# Patient Record
Sex: FEMALE | Race: WHITE | Employment: FULL TIME | URBAN - METROPOLITAN AREA
[De-identification: names, ages, dates, MRNs, and addresses within clinical notes are randomized per-mention and may not be internally consistent; named-entity substitution may affect disease eponyms.]

---

## 2018-10-10 ENCOUNTER — OFFICE VISIT (OUTPATIENT)
Dept: SURGERY | Facility: CLINIC | Age: 53
End: 2018-10-10
Payer: COMMERCIAL

## 2018-10-10 VITALS
WEIGHT: 293 LBS | BODY MASS INDEX: 51.91 KG/M2 | HEIGHT: 63 IN | SYSTOLIC BLOOD PRESSURE: 152 MMHG | RESPIRATION RATE: 12 BRPM | DIASTOLIC BLOOD PRESSURE: 102 MMHG | HEART RATE: 69 BPM | OXYGEN SATURATION: 96 %

## 2018-10-10 DIAGNOSIS — Z86.79 HISTORY OF HYPERTENSION: ICD-10-CM

## 2018-10-10 DIAGNOSIS — E66.01 MORBID OBESITY WITH BMI OF 50.0-59.9, ADULT (H): Primary | ICD-10-CM

## 2018-10-10 DIAGNOSIS — Z13.21 SCREENING FOR ENDOCRINE, NUTRITIONAL, METABOLIC AND IMMUNITY DISORDER: ICD-10-CM

## 2018-10-10 DIAGNOSIS — D64.9 CHRONIC ANEMIA: ICD-10-CM

## 2018-10-10 DIAGNOSIS — Z13.228 SCREENING FOR ENDOCRINE, NUTRITIONAL, METABOLIC AND IMMUNITY DISORDER: ICD-10-CM

## 2018-10-10 DIAGNOSIS — R60.0 LOWER EXTREMITY EDEMA: ICD-10-CM

## 2018-10-10 DIAGNOSIS — L30.4 INTERTRIGO: ICD-10-CM

## 2018-10-10 DIAGNOSIS — N39.3 STRESS INCONTINENCE: ICD-10-CM

## 2018-10-10 DIAGNOSIS — G43.909 MIGRAINE WITHOUT STATUS MIGRAINOSUS, NOT INTRACTABLE, UNSPECIFIED MIGRAINE TYPE: ICD-10-CM

## 2018-10-10 DIAGNOSIS — K59.09 CHRONIC CONSTIPATION: ICD-10-CM

## 2018-10-10 DIAGNOSIS — Z13.0 SCREENING FOR ENDOCRINE, NUTRITIONAL, METABOLIC AND IMMUNITY DISORDER: ICD-10-CM

## 2018-10-10 DIAGNOSIS — E66.01 MORBID OBESITY WITH BMI OF 50.0-59.9, ADULT (H): ICD-10-CM

## 2018-10-10 DIAGNOSIS — Z13.29 SCREENING FOR ENDOCRINE, NUTRITIONAL, METABOLIC AND IMMUNITY DISORDER: ICD-10-CM

## 2018-10-10 DIAGNOSIS — G47.9 SLEEP DISTURBANCE: ICD-10-CM

## 2018-10-10 DIAGNOSIS — Z13.0 SCREENING FOR IRON DEFICIENCY ANEMIA: ICD-10-CM

## 2018-10-10 PROBLEM — K52.9 CHRONIC DIARRHEA: Status: ACTIVE | Noted: 2018-10-10

## 2018-10-10 PROCEDURE — 99205 OFFICE O/P NEW HI 60 MIN: CPT | Performed by: PHYSICIAN ASSISTANT

## 2018-10-10 PROCEDURE — 97802 MEDICAL NUTRITION INDIV IN: CPT | Performed by: DIETITIAN, REGISTERED

## 2018-10-10 RX ORDER — AMLODIPINE BESYLATE 5 MG/1
5 TABLET ORAL DAILY
Refills: 3 | COMMUNITY
Start: 2018-09-30

## 2018-10-10 RX ORDER — MULTIPLE VITAMINS W/ MINERALS TAB 9MG-400MCG
1 TAB ORAL DAILY
COMMUNITY

## 2018-10-10 RX ORDER — TOPIRAMATE 25 MG/1
TABLET, FILM COATED ORAL
Refills: 3 | COMMUNITY
Start: 2018-09-30

## 2018-10-10 NOTE — LETTER
Kathy Iniguez  October 10, 2018        Bariatric Task List        Lose 10 lbs prior to surgery.  Goal Weight: 286.7 lbs  Have preoperative laboratory tests drawn.   Sleep study   Teeth replaced - left lower side  Psychological Evaluation with MMPI and clearance for weight loss surgery.  Achieve clearance from dietitian to see surgeon.  A total of 6 structured dietitian weight loss visits are required by your insurance.    Letter of support from your therapist    Constipation plan - see if miralax works    See primary regarding elevated blood pressure    Attend a bariatric support group

## 2018-10-10 NOTE — MR AVS SNAPSHOT
After Visit Summary   10/10/2018    Kathy Iniguez    MRN: [id number]           Patient Information     Date Of Birth          1965        Visit Information        Provider Department      10/10/2018 10:00 AM Rodríguez Alicea PA-C [facility]      Today's Diagnoses     Morbid obesity with BMI of 50.0-59.9, adult (H)    -  1    History of hypertension        Sleep disturbance        Lower extremity edema        Intertrigo        Migraine without status migrainosus, not intractable, unspecified migraine type        Stress incontinence        Chronic anemia        Screening for iron deficiency anemia        Screening for endocrine, nutritional, metabolic and immunity disorder        Chronic constipation          Care Instructions    Patient to follow up with Primary Care provider regarding elevated blood pressure.            Follow-ups after your visit        Additional Services     NUTRITION REFERRAL       Type of nutrition instruction needed: Weight Loss  Your provider has referred you to:     [facility]            SLEEP EVALUATION & MANAGEMENT REFERRAL - ADULT -[facility] [phone]  (Age 18 and up)       Bariatric Fast Track  BMI:  Symptoms:  obesity, witnessed apnea, snoring, ROBSON, nocturnal dyspnea, AM headaches, Elevated BP, HTN, Diabetes  SpO2:   CMP is pending in EPIC                  Your next 10 appointments already scheduled     Nov 07, 2018  9:30 AM CST   Return Bariatric Nutrition Visit with Rodríguez Alicea PA-C   [facility] ([facility])    [address] [place] [id number] [phone]              Future tests that were ordered for you today     Open Future Orders        Priority Expected Expires Ordered    IRON Routine  4/8/2019 10/10/2018    IRON AND IRON  "BINDING CAPACITY Routine  2019 10/10/2018    FERRITIN Routine  2019 10/10/2018    Vitamin D Screen Routine  2019 10/10/2018    Hemoglobin A1c Routine  2019 10/10/2018    Comprehensive metabolic panel Routine  2019 10/10/2018    CBC with platelets Routine  2019 10/10/2018    SLEEP EVALUATION & MANAGEMENT REFERRAL - ADULT -[facility] [place] [phone]  (Age 18 and up) Routine  10/10/2019 10/10/2018            Who to contact     If you have questions or need follow up information about today's clinic visit or your schedule please contact [facility] directly at [phone].  Normal or non-critical lab and imaging results will be communicated to you by MyChart, letter or phone within 4 business days after the clinic has received the results. If you do not hear from us within 7 days, please contact the clinic through MyChart or phone. If you have a critical or abnormal lab result, we will notify you by phone as soon as possible.  Submit refill requests through [facility] or call your pharmacy and they will forward the refill request to us. Please allow 3 business days for your refill to be completed.          Additional Information About Your Visit        [facility] Information     [facility] lets you send messages to your doctor, view your test results, renew your prescriptions, schedule appointments and more. To sign up, go to [email or web address] . Click on \"Log in\" on the left side of the screen, which will take you to the Welcome page. Then click on \"Sign up Now\" on the right side of the page.     You will be asked to enter the access code listed below, as well as some personal information. Please follow the directions to create your username and password.     Your access code is: [email or web address]  Expires: 1/3/2019 11:45 AM     Your access code will  in 90 days. If you need help or a new code, please call your [facility] clinic or " "[phone].        Care EveryWhere ID     This is your Care EveryWhere ID. This could be used by other organizations to access your [facility] medical records  [id number]        Your Vitals Were     Pulse Respirations Height Pulse Oximetry BMI (Body Mass Index)       69 12 5' 3\" (1.6 m) 96% 52.56 kg/m2        Blood Pressure from Last 3 Encounters:   10/10/18 (!) 152/102   02/24/12 145/79    Weight from Last 3 Encounters:   10/10/18 296 lb 11.2 oz (134.6 kg)              We Performed the Following     NUTRITION REFERRAL        Primary Care Provider Office Phone # Fax #    [facility] [phone] [address]  [facility] [address]        Equal Access to Services     KILLIAN HUSSEIN : Hadii yuly ku hadasho Soomaali, waaxda luqadaha, qaybta kaalmada adeegyada, waxelif lyons . So [facility] [phone].    ATENCIÓN: Si habla español, tiene a patel disposición servicios gratuitos de asistencia lingüística. Harman al [phone].    We comply with applicable federal civil rights laws and [place] laws. We do not discriminate on the basis of race, color, national origin, age, disability, sex, sexual orientation, or gender identity.            Thank you!     Thank you for choosing [facility]  for your care. Our goal is always to provide you with excellent care. Hearing back from our patients is one way we can continue to improve our services. Please take a few minutes to complete the written survey that you may receive in the mail after your visit with us. Thank you!             Your Updated Medication List - Protect others around you: Learn how to safely use, store and throw away your medicines at www.disposemymeds.org.          This list is accurate as of 10/10/18 12:26 PM.  Always use your most recent med list.                   Brand Name Dispense Instructions for use Diagnosis    amLODIPine 5 MG tablet    NORVASC    "  Take 5 mg by mouth daily        chlorthalidone 25 MG tablet    HYGROTON     Take 25 mg by mouth daily.        HYDROcodone-acetaminophen 5-325 MG per tablet    NORCO    20 tablet    Take 1-2 tablets by mouth every 4 hours as needed for pain.        Multi-vitamin Tabs tablet      Take 1 tablet by mouth daily        topiramate 25 MG tablet    TOPAMAX     1 TAB BEDTIME X 1 WK,2 TABS BEDTIME X 1 WK, 1 TAB MORNING AND 2 TABS AT BEDTIME X 1 WK,2 TABS TWICE

## 2018-10-10 NOTE — MR AVS SNAPSHOT
"                  MRN:[id number]                      After Visit Summary   10/10/2018    Kathy Iniguez    MRN: [id number]           Visit Information        Provider Department      10/10/2018 11:00 AM 1, Stephen Vazquez Diet, RD [facility]      Your next 10 appointments already scheduled     2018  9:30 AM CST   Return Bariatric Visit with Rodríguez Alicea PA-C   [facility])    [address] [place] [id number] [phone]            2018 10:30 AM CST   Return Bariatric Nutrition Visit with Stephen Wl Diet 1, RD   [facility])    [address] [place] [phone]              MyChart Information     [facility] lets you send messages to your doctor, view your test results, renew your prescriptions, schedule appointments and more. To sign up, go to [email or web address] . Click on \"Log in\" on the left side of the screen, which will take you to the Welcome page. Then click on \"Sign up Now\" on the right side of the page.     You will be asked to enter the access code listed below, as well as some personal information. Please follow the directions to create your username and password.     Your access code is: [email or web address]  Expires: 1/3/2019 11:45 AM     Your access code will  in 90 days. If you need help or a new code, please call your [facility] clinic or [phone].        Care EveryWhere ID     This is your Care EveryWhere ID. This could be used by other organizations to access your [facility] medical records  [id number]        Equal Access to Services     KILLIAN REEVES: Kiet Forde, waerikada luqadaha, qaybta kaalmada dave, jose michel So [facility] [phone].    ATENCIÓN: Si " habla galina, tiene a patel disposición servicios gratuitos de asistencia lingüística. Llame al [phone].    We comply with applicable federal civil rights laws and [place] laws. We do not discriminate on the basis of race, color, national origin, age, disability, sex, sexual orientation, or gender identity.

## 2018-10-10 NOTE — PROGRESS NOTES
"    New Bariatric Surgery Consultation Note    RE: Kathy Iniguez  MR#: [id number]  : 1965      Referring provider: Emmy Nath MD at [facility] in [place]    Chief Complaint/Reason for visit: evaluation for possible weight loss surgery    Dear Clinic, [facility] (General),    I had the pleasure of seeing your patient, Kathy Iniguez, to evaluate her obesity and consider her for possible weight loss surgery. As you know, Kathy Iniguez is 53 year old.  She has a height of 5' 3\", a weight of 296 lbs 11.2 oz, and calculated Body mass index is 52.56 kg/(m^2).     Here with her .      HISTORY OF PRESENT ILLNESS:  Weight Loss History Reviewed with Patient 10/10/2018   How long have you been overweight? Following one or more pregnancies   What is the most that you have ever weighed? 310   What is the most weight you have lost? 10   I have tried the following methods to lose weight Watching portions or calories, Exercise, Weight Watchers   I have tried the following weight loss medications? (Check all that apply) None   Have you ever had weight loss surgery? No     No meds for weight loss    CO-MORBIDITIES OF OBESITY INCLUDE:     10/10/2018   I have the following co-morbidities associated with obesity: High Blood Pressure, Sleep Apnea, Polycystic Ovarian Syndrome, Lower Extremity Edema, Asthma, Weight Bearing Joint Pain     Takes two medications for blood pressure.    Admits to snoring and morning headaches.  Has never had a sleep study  Has not been diagnosed with PCOS.    PAST MEDICAL HISTORY:  Past Medical History:   Diagnosis Date     Depressive disorder      Hypertension        PAST SURGICAL HISTORY:  Past Surgical History:   Procedure Laterality Date     ABDOMEN SURGERY        SECTION       CHOLECYSTECTOMY       No personal or family history of blood clots or anesthesia concerns      FAMILY HISTORY:   Family History   Problem Relation Age of Onset     Diabetes " Mother      Hypertension Mother      Diabetes Father      Hypertension Father      Coronary Artery Disease Father      Diabetes Brother      Hypertension Brother      Diabetes Sister      Hypertension Sister        SOCIAL HISTORY:   Social History Questions Reviewed With Patient 10/10/2018   Which best describes your employment status (select all that apply) I am unemployed   Which best describes your marital status:    Do you have children? Yes   Who do you have in your support network that can be available to help you for the first 2 weeks after surgery?    Who can you count on for support throughout your weight loss surgery journey? family   Can you afford 3 meals a day?  Yes   Can you afford 50-60 dollars a month for vitamins? Yes     Patient is currently dealing with a left foot injury that happened at work    HABITS:     10/10/2018   How often do you drink alcohol? Never   Do you currently use any of the following Nicotine products? No   Have you ever used any of the following nicotine products? No   Have you or are you currently using street drugs or prescription strength medication for which you do not have a prescription for? No   Do you have a history of chemical dependency (alcohol or drug abuse)? No       PSYCHOLOGICAL HISTORY:   Psychological History Reviewed With Patient 10/10/2018   Have you ever attempted suicide? Never.   Have you ever been hospitalized for mental illness or a suicide attempt? Never.   Do you have a history of chronic pain? No   Have you ever been diagnosed with fibromyalgia? No   Are you currently seeing a therapist or counselor?  Yes   Are you currently seeing a psychiatrist? No        Has been seeing therapist for the past 2 months.  That therapist has now left and pt is scheduled to see a new therapist.  Seeing on a weekly basis for depression.  Not on meds. Feels therapy is helping    ROS:     10/10/2018   Skin:  Skin fold rashes (groin or other folds), Leg  swelling, Varicose veins   HEENT: Headaches, Dizziness/lightheadedness, Teeth, dentures, or bridges needing repairs   Musculoskeletal: Joint Pain, Back pain, Limited mobility, Swelling of legs   Cardiovascular: Chest pain, Shortness of breath with activity   Pulmonary: Shortness of breath at rest, Shortness of breath with activity, Snoring, Awaken from sleep to catch your breath, Excessively sleep during the day, Experience morning headaches   Gastrointestinal: Constipation   Genitourinary: Stress incontinence (losing urine when coughing, sneezing, etc.)   Hematological: None of the above   Neurological: Migraine headaches   Female only: Post-menopausal       Gets migraines once per month.  Since her weight has gone up so has the frequency of migraines    Has a history of chronic anemia.  Menstrating only a couple times per year. Has dealt with chronic constipation.  She does not like taking iron because it makes her constipation worse. Instead will have a papaya smoothie.  So right now she is not taking any medications for constipation.  Discussed that following bariatric surgery it is crucial that at a minimum she take MVI with iron.  Let her know constipation needs to be better managed ahead of surgery as it could get worse after surgery.  Came up with a plan to try miralax and titrate to the most effective dose.  Will get her iron labs now for a baseline    EATING BEHAVIORS:     10/10/2018   Have you or anyone else thought that you had an eating disorder? Yes   If you answered yes to the previous eating disorder question, select the types that apply from this list: Binge Eating   Do you currently binge eat (eat a large amount of food in a short time)? Yes   Are you an emotional eater? Yes   Do you get up to eat after falling asleep? Yes     Feels hungry and eats about 3 servings to her husbands 1 serving    EXERCISE:     10/10/2018   How often do you exercise? Never   What keeps you from being more active?  Pain  "      MEDICATIONS:  Current Outpatient Prescriptions   Medication     amLODIPine (NORVASC) 5 MG tablet     multivitamin, therapeutic with minerals (MULTI-VITAMIN) TABS tablet     topiramate (TOPAMAX) 25 MG tablet     chlorthalidone (HYGROTEN) 25 MG tablet     hydrocodone-acetaminophen 5-325 MG per tablet     No current facility-administered medications for this visit.        ALLERGIES:  Allergies   Allergen Reactions     Penicillins Rash       LABS/IMAGING/MEDICAL RECORDS REVIEW: None    PHYSICAL EXAM:  BP (!) 152/102  Pulse 69  Resp 12  Ht 5' 3\" (1.6 m)  Wt 296 lb 11.2 oz (134.6 kg)  SpO2 96%  BMI 52.56 kg/m2     138/90 repeat BP    GENERAL:  Good development and normal affect in no acute distress. Patient is alert and orientated x 3 and answers all questions appropriately.  HEENT: Head is normocephalic, nontraumatic. Pupils equal and round without conjunctival injection. Neck is supple without lymphadenopathy, thyroidmegaly, or mass. Trachea midline. Dentition WNL. Has upper bridge.  Missing 2-3 teeth left lower side and 1 right lower side  CARDIOVASCULAR:  Regular rate and rhythm without murmurs, rubs, or gallops.  RESPIRATORY: Lungs are clear to auscultation bilaterally with good breath sounds.  GASTROINTESTINAL: Abdomen is obese, nondistended, soft, nontender, without organomegaly or masses. No bruits.  LOWER EXTREMITIES: No LE edema bilaterally, no cyanosis, ulceration, or chronic venous stasis noted.  MUSCULOSKELETAL:  Moves all 4 extremities equal and strong. Has a normal gait.   NEUROLOGIC: Cranial nerves II-XII grossly intact.  SKIN: No intertriginous irritation or rash.       In summary, Kathy Iniguez has Morbid obesity with a Body mass index is 52.56 kg/(m^2).  and the comorbidities stated above. She completed an informational seminar and is a candidate for the laparoscopic gastric sleeve.  She will have to complete the following pre-requisites:    Lose 10 lbs prior to surgery.  Goal Weight: 286.7 " lbs  Have preoperative laboratory tests drawn. - including iron labs  Sleep study - ordered today  Teeth replaced - left lower side  Psychological Evaluation with MMPI and clearance for weight loss surgery.  Achieve clearance from dietitian to see surgeon.  A total of 6 structured dietitian weight loss visits are required by your insurance.    Letter of support from your therapist - template provided    Constipation plan - see if miralax works    See primary regarding elevated blood pressure AND chest pain    Attend a bariatric support group      Today discussed patients medical history as the appointment got started late due to questionnaire not being complete.  The patient and her  did verbalize understanding of the visit but for next months discussion about the surgeries a  will be available.  Next month will discuss preoperative, perioperative, and postoperative processes, management, and follow up.  Will also discuss risks and benefits.  I emphasized exercise and activity along with appropriate food choice as the main foundation for weight loss with surgery providing surgical reinforcement of this.  All questions were answered.  A goal sheet and support group handout were given to the patient.    Once the patient has completed the requirements in their task list and there are no further recommendations, the pt will be allowed to see the surgeon of her choice for consultation on the laparoscopic gastric bypass surgery.     Sincerely,    Rodríguez Alicea PA-C    I spent a total of  60 minutes face to face with Kathy during today's office visit. Over 50% of this time was spent counseling the patient and/or coordinating care.

## 2018-10-10 NOTE — PROGRESS NOTES
"New Bariatric Nutrition Consultation Note    Reason For Visit: Nutrition Assessment    Kathy Iniguez is a 53 year old presenting today for new bariatric nutrition consult.  Pt is interested in laparoscopic undecided.  Patient is accompanied by , Wei    Support System Reviewed With Patient 10/10/2018   Who do you have in your support network that can be available to help you for the first 2 weeks after surgery?    Who can you count on for support throughout your weight loss surgery journey? family       ANTHROPOMETRICS:  Estimated body mass index is 52.56 kg/(m^2) as calculated from the following:    Height as of an earlier encounter on 10/10/18: 1.6 m (5' 3\").    Weight as of an earlier encounter on 10/10/18: 134.6 kg (296 lb 11.2 oz).    Required weight loss goal pre-op: 10 lbs from initial consult weight (goal weight 286.7 lbs or less before surgery)       10/10/2018   I have tried the following methods to lose weight Watching portions or calories, Exercise, Weight Watchers       Weight Loss Questions Reviewed With Patient 10/10/2018   How long have you been overweight? Following one or more pregnancies       SUPPLEMENT INFORMATION:  1 multivitamin/ mineral injection  (purchased in [place])    NUTRITION HISTORY:  Recall Diet Questions Reviewed With Patient 10/10/2018   Describe what you typically consume for breakfast (typical or most recent): Skips breakfast 3X per week or coffee and egg sandwich sometimes with sausage or cheese   Describe what you typically consume for lunch (typical or most recent): 3 cups fried rice with tomato sauce and chicken broth, 1 piece chicken with salsa or fried   Describe what you typically consume for supper (typical or most recent): 3-5 cups fried rice and chicken (see lunch)   Describe what you typically consume as snacks (typical or most recent): whatever is available-left over quesadilla with or without cheese and butter   How many ounces of water, or other low " calorie drinks, do you drink daily (8 oz=1 glass)? 24 oz   How many ounces of caffeine (coffee, tea, pop) do you drink daily (8 oz=1 glass)? 24 oz (Coke)/ 16 oz (coffee)   How many ounces of carbonated (pop, beer, sparkling water) drinks do you drinky daily (8 oz=1 glass)? 24 oz   How many ounces of milk do you drink daily (8 oz=1 glass) 8 oz   Please indicate the type of milk: 2%   How often do you drink alcohol? Never       Eating Habits 10/10/2018   Do you have any dietary restrictions? No   Do you currently binge eat (eat a large amount of food in a short time)? Yes   Are you an emotional eater? Yes- stress or sadness   Do you get up to eat after falling asleep? Yes-1-2 X per month   What foods do you crave? Sweets (1 donuts or 1 muffin or 1 slice Mexican bread)       ADDITIONAL INFORMATION:  Patient cooks for spouse and 4 young and adult children who live at home. Cooks mainly Mexican type foods, but is trying to cut back on cooking rice. Emotional eating is related to feeling sad or stressed. Do not feel pt has a kristan eating disorder, but over eats along with some meal skipping. Used food models/ measuring cups when trying to quantify diet hx.     Dining Out History Reviewed With Patient 10/10/2018   How often do you dine out? Around once a week.   Where do you dine out? (select all that apply) sit-down restaurants   What types of food do you order when you dine out? big fried tortilla       Physical Activity Reviewed With Patient 10/10/2018   How often do you exercise? 2 X per week/ pool therapy   What keeps you from being more active?  Pain       NUTRITION DIAGNOSIS:  Obesity r/t long history of self-monitoring deficit and excessive energy intake aeb BMI >30 kg/m2.    INTERVENTION:  Intervention Provided/Education Provided on post-op diet guidelines, vitamins/minerals essential post-operatively, GI anatomy of bariatric surgeries, ways to help prepare for post-op diet guidelines pre-operatively,  portion/calorie-control, mindful eating .  Provided pt with list of goals RD contact information.      Questions Reviewed With Patient 10/10/2018   How ready are you to make changes regarding your weight? Number 1 = Not ready at all to make changes up to 10 = very ready. 10   How confident are you that you can change? 1 = Not confident that you will be successful making changes up to 10 = very confident. 5       Patient Understanding: fair-good  Expected Compliance: fair-good    GOALS:  Relating To Eating:  Eat breakfast daily or have a protein drink  Relating to beverages:  Reduce carbonated drinks by 50%  Relating to dietary supplements:  Will need to roxi goal next  month  Relating to cravings:  Try sipping water and avoid going into the kitchen when waking in the night    Follow-Up:   Recommend 5 follow up visits to assist with lifestyle changes or per insurance.      Time spent with patient: 55 minutes.    Navarro Edwards RD, LD  [facility]  [phone]

## 2018-11-07 ENCOUNTER — OFFICE VISIT (OUTPATIENT)
Dept: SURGERY | Facility: CLINIC | Age: 53
End: 2018-11-07
Payer: COMMERCIAL

## 2018-11-07 ENCOUNTER — HOSPITAL ENCOUNTER (OUTPATIENT)
Dept: LAB | Facility: CLINIC | Age: 53
Discharge: HOME | End: 2018-11-07
Attending: PHYSICIAN ASSISTANT | Admitting: PHYSICIAN ASSISTANT
Payer: COMMERCIAL

## 2018-11-07 VITALS — WEIGHT: 293 LBS | BODY MASS INDEX: 51.91 KG/M2 | HEIGHT: 63 IN

## 2018-11-07 VITALS
HEART RATE: 96 BPM | SYSTOLIC BLOOD PRESSURE: 146 MMHG | DIASTOLIC BLOOD PRESSURE: 98 MMHG | HEIGHT: 63 IN | RESPIRATION RATE: 18 BRPM | WEIGHT: 293 LBS | OXYGEN SATURATION: 95 % | BODY MASS INDEX: 51.91 KG/M2

## 2018-11-07 DIAGNOSIS — Z71.89 ENCOUNTER FOR PRE-BARIATRIC SURGERY COUNSELING AND EDUCATION: ICD-10-CM

## 2018-11-07 DIAGNOSIS — L30.4 INTERTRIGO: ICD-10-CM

## 2018-11-07 DIAGNOSIS — D64.9 CHRONIC ANEMIA: ICD-10-CM

## 2018-11-07 DIAGNOSIS — Z13.228 SCREENING FOR ENDOCRINE, NUTRITIONAL, METABOLIC AND IMMUNITY DISORDER: ICD-10-CM

## 2018-11-07 DIAGNOSIS — Z13.0 SCREENING FOR IRON DEFICIENCY ANEMIA: ICD-10-CM

## 2018-11-07 DIAGNOSIS — Z13.21 SCREENING FOR ENDOCRINE, NUTRITIONAL, METABOLIC AND IMMUNITY DISORDER: ICD-10-CM

## 2018-11-07 DIAGNOSIS — E66.01 MORBID OBESITY WITH BMI OF 50.0-59.9, ADULT (H): Primary | ICD-10-CM

## 2018-11-07 DIAGNOSIS — Z86.79 HISTORY OF HYPERTENSION: ICD-10-CM

## 2018-11-07 DIAGNOSIS — E66.01 MORBID OBESITY WITH BMI OF 50.0-59.9, ADULT (H): ICD-10-CM

## 2018-11-07 DIAGNOSIS — Z13.0 SCREENING FOR ENDOCRINE, NUTRITIONAL, METABOLIC AND IMMUNITY DISORDER: ICD-10-CM

## 2018-11-07 DIAGNOSIS — R60.0 LOWER EXTREMITY EDEMA: ICD-10-CM

## 2018-11-07 DIAGNOSIS — Z13.29 SCREENING FOR ENDOCRINE, NUTRITIONAL, METABOLIC AND IMMUNITY DISORDER: ICD-10-CM

## 2018-11-07 LAB
ALBUMIN SERPL-MCNC: 3.8 G/DL (ref 3.4–5)
ALP SERPL-CCNC: 99 U/L (ref 40–150)
ALT SERPL W P-5'-P-CCNC: 105 U/L (ref 0–50)
ANION GAP SERPL CALCULATED.3IONS-SCNC: 4 MMOL/L (ref 3–14)
AST SERPL W P-5'-P-CCNC: 63 U/L (ref 0–45)
BILIRUB SERPL-MCNC: 0.9 MG/DL (ref 0.2–1.3)
BUN SERPL-MCNC: 10 MG/DL (ref 7–30)
CALCIUM SERPL-MCNC: 9.1 MG/DL (ref 8.5–10.1)
CHLORIDE SERPL-SCNC: 104 MMOL/L (ref 94–109)
CO2 SERPL-SCNC: 32 MMOL/L (ref 20–32)
CREAT SERPL-MCNC: 0.6 MG/DL (ref 0.52–1.04)
DEPRECATED CALCIDIOL+CALCIFEROL SERPL-MC: 10 UG/L (ref 20–75)
ERYTHROCYTE [DISTWIDTH] IN BLOOD BY AUTOMATED COUNT: 14.6 % (ref 10–15)
FERRITIN SERPL-MCNC: 35 NG/ML (ref 8–252)
GFR SERPL CREATININE-BSD FRML MDRD: >90 ML/MIN/1.7M2
GLUCOSE SERPL-MCNC: 108 MG/DL (ref 70–99)
HBA1C MFR BLD: 6.3 % (ref 0–5.6)
HCT VFR BLD AUTO: 45 % (ref 35–47)
HGB BLD-MCNC: 14.6 G/DL (ref 11.7–15.7)
IRON SATN MFR SERPL: 24 % (ref 15–46)
IRON SERPL-MCNC: 85 UG/DL (ref 35–180)
MCH RBC QN AUTO: 28.1 PG (ref 26.5–33)
MCHC RBC AUTO-ENTMCNC: 32.4 G/DL (ref 31.5–36.5)
MCV RBC AUTO: 87 FL (ref 78–100)
PLATELET # BLD AUTO: 160 10E9/L (ref 150–450)
POTASSIUM SERPL-SCNC: 3.5 MMOL/L (ref 3.4–5.3)
PROT SERPL-MCNC: 7.8 G/DL (ref 6.8–8.8)
RBC # BLD AUTO: 5.19 10E12/L (ref 3.8–5.2)
SODIUM SERPL-SCNC: 140 MMOL/L (ref 133–144)
TIBC SERPL-MCNC: 361 UG/DL (ref 240–430)
WBC # BLD AUTO: 7.1 10E9/L (ref 4–11)

## 2018-11-07 PROCEDURE — 83550 IRON BINDING TEST: CPT | Performed by: PHYSICIAN ASSISTANT

## 2018-11-07 PROCEDURE — 82728 ASSAY OF FERRITIN: CPT | Performed by: PHYSICIAN ASSISTANT

## 2018-11-07 PROCEDURE — 80053 COMPREHEN METABOLIC PANEL: CPT | Performed by: PHYSICIAN ASSISTANT

## 2018-11-07 PROCEDURE — 97803 MED NUTRITION INDIV SUBSEQ: CPT | Performed by: DIETITIAN, REGISTERED

## 2018-11-07 PROCEDURE — 99214 OFFICE O/P EST MOD 30 MIN: CPT | Performed by: PHYSICIAN ASSISTANT

## 2018-11-07 PROCEDURE — 36415 COLL VENOUS BLD VENIPUNCTURE: CPT | Performed by: PHYSICIAN ASSISTANT

## 2018-11-07 PROCEDURE — 83036 HEMOGLOBIN GLYCOSYLATED A1C: CPT | Performed by: PHYSICIAN ASSISTANT

## 2018-11-07 PROCEDURE — 83540 ASSAY OF IRON: CPT | Performed by: PHYSICIAN ASSISTANT

## 2018-11-07 PROCEDURE — 85027 COMPLETE CBC AUTOMATED: CPT | Performed by: PHYSICIAN ASSISTANT

## 2018-11-07 PROCEDURE — 82306 VITAMIN D 25 HYDROXY: CPT | Performed by: PHYSICIAN ASSISTANT

## 2018-11-07 NOTE — MR AVS SNAPSHOT
After Visit Summary   11/7/2018    Kathy Iniguez    MRN: [id number]           Patient Information     Date Of Birth          1965        Visit Information        Provider Department      11/7/2018 9:30 AM Rodríguez Alicea PA-C; LANGUAGE BANC [facility]      Today's Diagnoses     Morbid obesity with BMI of 50.0-59.9, adult (H)    -  1    History of hypertension        Lower extremity edema        Intertrigo        Encounter for pre-bariatric surgery counseling and education          Care Instructions    Patient to follow up with Primary Care provider regarding elevated blood pressure.            Follow-ups after your visit        Your next 10 appointments already scheduled     Dec 05, 2018 10:00 AM CST   (Arrive by 9:45 AM)   Return Bariatric Visit with Rodríguez Alicea PA-C   [facility] ([facility])    [address] [place] [address] [phone]            Dec 05, 2018 11:00 AM CST   (Arrive by 10:45 AM)   Return Bariatric Nutrition Visit with Stephen Vazquez Diet 1, RD   [facility] ([facility])    [address] [place] [address] [phone]              Who to contact     If you have questions or need follow up information about today's clinic visit or your schedule please contact [facility] directly at [phone].  Normal or non-critical lab and imaging results will be communicated to you by Corvilhart, letter or phone within 4 business days after the clinic has received the results. If you do not hear from us within 7 days, please contact the clinic through Corvilhart or phone. If you have a critical or abnormal lab result, we will notify you by phone as soon as possible.  Submit refill requests through [employer] or  "call your pharmacy and they will forward the refill request to us. Please allow 3 business days for your refill to be completed.          Additional Information About Your Visit        Care EveryWhere ID     This is your Care EveryWhere ID. This could be used by other organizations to access your [facility] medical records  [id number]        Your Vitals Were     Pulse Respirations Height Pulse Oximetry BMI (Body Mass Index)       96 18 5' 3\" (1.6 m) 95% 52.2 kg/m2        Blood Pressure from Last 3 Encounters:   11/07/18 (!) 146/98   10/10/18 (!) 152/102   02/24/12 145/79    Weight from Last 3 Encounters:   11/07/18 294 lb 11.2 oz (133.7 kg)   11/07/18 294 lb 11.2 oz (133.7 kg)   10/10/18 296 lb 11.2 oz (134.6 kg)              Today, you had the following     No orders found for display       Primary Care Provider Office Phone # Fax #    [facility] [phone] [address]  [facility] [address]        Equal Access to Services     [facility] : Hadii aad ku hadasho Soomaali, waaxda luqadaha, qaybta kaalmada adeegyada, jose lyons . So [facility] [phone].    ATENCIÓN: Si habla español, tiene a patel disposición servicios gratuitos de asistencia lingüística. [facility] [phone].    We comply with applicable federal civil rights laws and [place] laws. We do not discriminate on the basis of race, color, national origin, age, disability, sex, sexual orientation, or gender identity.            Thank you!     Thank you for choosing [facility]  for your care. Our goal is always to provide you with excellent care. Hearing back from our patients is one way we can continue to improve our services. Please take a few minutes to complete the written survey that you may receive in the mail after your visit with us. Thank you!             Your Updated Medication List - Protect others around you: Learn how to safely " use, store and throw away your medicines at www.disposemymeds.org.          This list is accurate as of 11/7/18 12:05 PM.  Always use your most recent med list.                   Brand Name Dispense Instructions for use Diagnosis    amLODIPine 5 MG tablet    NORVASC     Take 5 mg by mouth daily        chlorthalidone 25 MG tablet    HYGROTON     Take 25 mg by mouth daily.        HYDROcodone-acetaminophen 5-325 MG per tablet    NORCO    20 tablet    Take 1-2 tablets by mouth every 4 hours as needed for pain.        Multi-vitamin Tabs tablet      Take 1 tablet by mouth daily        topiramate 25 MG tablet    TOPAMAX     1 TAB BEDTIME X 1 WK,2 TABS BEDTIME X 1 WK, 1 TAB MORNING AND 2 TABS AT BEDTIME X 1 WK,2 TABS TWICE

## 2018-11-07 NOTE — PROGRESS NOTES
"NUTRITION {POST-OP/POST-PROCEDURE:146024} APPOINTMENT  DATE OF VISIT: November 7, 2018    Kathy Iniguez  1965  female  [id number]  53 year old     ASSESSMENT:    REASON FOR VISIT:  Kathy is a 53 year old year old female presents today for {Numbers; 0-30:35431} {MONTH_YEAR:087393} PO nutrition follow-up appointment. Patient is accompanied by {ACCOMPANIED BY (ADULT):328191}.    DIAGNOSIS:  Status post {SWL SURGERY TYPE:687012} surgery.  Obesity {.:184225}     ANTHROPOMETRICS:  Initial Weight: 134.6 kg (296 lb 11.2 oz)  Height: 160 cm (5' 3\")  Current Weight: 133.7 kg (294 lb 11.2 oz)   BMI: 52.31 kg/(m^2).    VITAMINS AND MINERALS:  1 Multivitamin with Mineral injection everyother day (Prescription form  In [place])    NUTRITION HISTORY:  Breakfast: fruit and eggs  Lunch:1.5 cups barley goulash (tomato sauce, ground beef)  Supper: meat, 1.5 cups rice, cooked vegetables or home made Gorditos  Snacks: yogurt or fruit-1-2X per day  Fluids consumed: Water and decaf coffee  Consuming liquid calories: No  Protein intake: *** grams/day  Tolerate regular texture food: {Yes & No:422389}  Any foods not tolerated details: {Yes & No:511520}  If any food not tolerated: ***  Portion size: {Portion size:419955}  Take 20-30 minutes to consume each meal: {Yes & No:051215}   Eat protein foods first: {Yes & No:398367}  Fluids and meals separate by at least 30 minutes: {Yes & No:081436}  Rinsing balloon per protocol: {Yes & No:802835}  Chew foods thoroughly: {Yes & No:478177}  Tolerating diet: {Yes & No:445192}  Drinking high protein supplements: {Yes & No:954669}  Consuming snacks per day: ***  Additional Information: ***        PHYSICAL ACTIVITY:  Type: ***  Frequency (days per week): {1-7:759214}  Duration (min): ***    DIAGNOSIS:  Previous Nutrition Diagnosis: Altered gastrointestinal function related to alteration in gastrointestinal structure as evidenced by history of {SWL SURGERY TYPE:594643} surgery.- no " change    Previous goals:  Relating To Eating:  Eat breakfast daily or have a protein drink-improving, eating solid foods  Relating to beverages:  Reduce carbonated drinks by 50%-met (only had 2-3 sips)  Relating to dietary supplements:  Will need to set a goal next  month  Relating to cravings:  Try sipping water and avoid going into the kitchen when waking in the night-improving (eating 1 container of yogurt 2X per week)    Current Nutrition Diagnosis: Altered gastrointestinal function related to alteration in gastrointestinal structure as evidenced by history of {SWL SURGERY TYPE:442218} surgery.    INTERVENTION:   Nutrition Prescription: Eat 3 meals a day at regular intervals. Consume 60-90 grams of protein daily. Follow {POST-SURGICAL/POST-PROCEDURE:599245} vitamin and mineral protocol.  Assessed learning needs and learning preferences.    GOALS:  Relating To Eating:  { SURGERY-GOALS RELATED EATIN}     Relating to beverages:  { SURGERY GOALS RELATING BEVERAGE:251745788}    Relating to dietary supplements:  { SURGERY GOALS DIETARY SUP:675603058}    Relating to activity:  { SURGERY GOALS RELATING ACTVITY:902801258}    Relating to cravings:  { SURGERY GOALS RELATING CRAVIN}    Follow-Up:   Recommend *** follow up visits to assist with lifestyle changes or per insurance.  Implementation: Discussed progress toward previous goals; reinforced importance of following bariatric lifestyle changes.    NUTRITION MONITORING AND EVALUATION:  Anticipated compliance: { :782053}  Verbalized { :813320} understanding.    Follow up: Patient to follow up in *** months.    TIME SPENT WITH PATIENT:  *** minutes    Navarro Edwards RD, LD  [facility]  [phone]

## 2018-11-07 NOTE — PROGRESS NOTES
"PRE SURGICAL WEIGHT LOSS NUTRITION APPOINTMENT    Kathy Iniguez  1965  female  [id number]  53 year old    ASSESSMENT    Desired Surgical Procedure: undecided    REASON FOR VISIT:  Kathy Iniguez is a 53 year old year old female presents today for a pre-surgical weight loss follow-up appointment. Patient accompanied by , Shabbir and a     DIAGNOSIS:  Weight Status Obesity Grade III BMI >40    ANTHROPOMETRICS:  Height: 160 cm (5' 3\")  Initial Weight:  134.6 kg (296 lb 11.2 oz)     Current weight: 133.7 kg (294 lb 11.2 oz)  BMI: 52.31 kg/(m^2).    VITAMINS AND MINERALS:  1 Multivitamin with Minerals (injections every other day from [place])    NUTRITION HISTORY:  Breakfast: fruit and eggs  Lunch:1.5 cups barley goulash (tomato sauce, ground beef)  Supper: meat, 1.5 cups rice, cooked vegetables or home made Gorditos  Snacks: yogurt or fruit-1-2X per day  Fluids consumed: Water and decaf coffee  Consuming liquid calories: No  Eating slower: Yes  Chewing foods thoroughly: Yes  Take 20-30 minutes to consume each meal: Yes  Fluids and meals separate by at least 30 minutes: No  Carbonation: sips  Caffeine: none  Additional Information: Patient attributes weight loss to: eating slower, chewing her foods well and eating smaller portions. Avoiding bread, tortillas and eating less rice are all things patient is proud of. Spouse seems to be very supportive of patient as it related to making bariatric lifestyle changes. Patient was able to read the goals we set today.    PHYSICAL ACTIVITY:  Type: pool therapy  Frequency: 2 (days per week)  Duration: 60 (minutes)     DIAGNOSIS:  Previous Nutrition Diagnosis: Obesity related to long history of self- monitoring deficit and excessive energy intake evidenced by BMI of 52.56 kg/m2  No change, modified below    Previous goals:   Relating To Eating:  Eat breakfast daily or have a protein drink-improving, eating solid foods  Relating to beverages:  Reduce " carbonated drinks by 50%-met (only had 2-3 sips)  Relating to dietary supplements:  Will need to set a goal next  month  Relating to cravings:  Try sipping water and avoid going into the kitchen when waking in the night-improving (eating 1 container of yogurt 2X per week)    Current Nutrition Diagnosis: Obesity related to long history of self-monitoring deficit and excessive energy intake as evidenced by BMI of 52.31 kg/m2.    INTERVENTION:  Nutrition Prescription: Recommended energy/nutrient modification.    GOALS:  Start 600 mg calcium 2X per day and take 2 hours away from multivitamins/ mineral  Bring multivitamins/ mineral nutrition label  Eat breakfast 7 days per week  Practice  fluids and meals by 30 minutes        Intervention:  -Congratulated patient on weight loss and behavior changes  - Discussed progress towards previous goals.  - Reinforced importance of making behavior changes in preparation for bariatric surgery.   - Assessed learning needs and learning preferences       NUTRITION MONITORING AND EVALUATION:  Anticipated compliance: fair-good  Patient demonstrated fair-good understanding.       Follow up: Continue to monitor patient closely regarding weight loss and diet.  # of visits needed: 2-3  Cleared by RD: No     TIME SPENT WITH PATIENT: 40 minutes    Navarro Edwards RD, LD  [facility]  [phone]

## 2018-11-07 NOTE — MR AVS SNAPSHOT
MRN:[id number]                      After Visit Summary   11/7/2018    Kathy Iniguez    MRN: [id number]           Visit Information        Provider Department      11/7/2018 10:30 AM 1, Stephen Gates RD; LANGUAGE BANC [facility]      Your next 10 appointments already scheduled     Dec 05, 2018 10:00 AM CST   (Arrive by 9:45 AM)   Return Bariatric Visit with Rodríguez Alicea PA-C   [facility] ([facility])    [address]  [address]  [facility] [address]   [phone]            Dec 05, 2018 11:00 AM CST   (Arrive by 10:45 AM)   Return Bariatric Nutrition Visit with Stephen Gates 1, RD   [facility] ([facility])    [address]  [address]  [facility] [address]   [phone]              Care EveryWhere ID     This is your Care EveryWhere ID. This could be used by other organizations to access your [facility] medical records  [id number]        Equal Access to Services     KILLIAN HUSSEIN AH: Hadii yuly barrett hadasho Sodaphneali, waaxda luqadaha, qaybta kaalmada adelefty, jose jane. So [facility] [phone].    ATENCIÓN: Si habla español, tiene a patel disposición servicios gratannieos de asistencia lingüística. Llame al [phone].    We comply with applicable federal civil rights laws and [place] laws. We do not discriminate on the basis of race, color, national origin, age, disability, sex, sexual orientation, or gender identity.

## 2018-11-07 NOTE — PROGRESS NOTES
"[facility]        2018      RE: Kathy Iniguez  MR#: [id number]  : 1965    Patient is in today for further bariatric education following her initial visit on 10/10/2018. She is here with her  today.  An  came but both the patient and the  arrived late.  Pt states in the past month she has not felt that great.  Has asthma and has not been very motivated to work on task list items. Several minutes were spent reviewing her task list items today.    Lose 10 lbs prior to surgery.  Goal Weight: 286.7 lbs - Patient is down 2 lbs to 294 today  Have preoperative laboratory tests drawn. - including iron labs - Not done  Sleep study - ordered today - Not done.  Has anxiety about it. Explained the process to her and asked that she set up the initial visit with the sleep center so they can better explain the process for her.   Teeth replaced - left lower side - has appointment  Psychological Evaluation with MMPI and clearance for weight loss surgery - Not scheduled. It took several minutes to explain to patient that this necessary evaluation is different from her own therapist that she works with.  Achieve clearance from dietitian to see surgeon.  A total of 6 structured dietitian weight loss visits are required by your insurance.     Letter of support from your therapist - template provided - Not done     Constipation plan - see if miralax works - Not tried yet     See primary regarding elevated blood pressure AND chest pain - Not done yet     Attend a bariatric support group - Not yet       BP (!) 146/98 (BP Location: Left arm, Patient Position: Sitting, Cuff Size: Adult Large)  Pulse 96  Resp 18  Ht 5' 3\" (1.6 m)  Wt 294 lb 11.2 oz (133.7 kg)  SpO2 95%  BMI 52.2 kg/m2   General: NAD    Most of the visit today was spent discussing each of the task list items with the  present.  Pt does have some anxiety about the process and " the task list items.  Encouraged her that overall the bariatric procedures are very safe.  Preoperative, perioperative, and postoperative processes and management were addressed. Next month will schedule another 60 minute visit to discuss the day of discharge from the hospital, follow up, the bariatric procedures and risks and benefit.  Informed patient that it is very important that she understands the process and lifestyle necessary     Transportation is an issue as patient relies on her  to get her here and he is now working overnight.       This was a 36 minute visit with greater than 50% spent on counseling.

## 2019-03-09 ENCOUNTER — HEALTH MAINTENANCE LETTER (OUTPATIENT)
Age: 54
End: 2019-03-09

## 2020-03-01 ENCOUNTER — HEALTH MAINTENANCE LETTER (OUTPATIENT)
Age: 55
End: 2020-03-01

## 2020-12-14 ENCOUNTER — HEALTH MAINTENANCE LETTER (OUTPATIENT)
Age: 55
End: 2020-12-14

## 2021-04-18 ENCOUNTER — HEALTH MAINTENANCE LETTER (OUTPATIENT)
Age: 56
End: 2021-04-18

## 2021-10-02 ENCOUNTER — HEALTH MAINTENANCE LETTER (OUTPATIENT)
Age: 56
End: 2021-10-02

## 2021-10-23 ENCOUNTER — APPOINTMENT (OUTPATIENT)
Dept: GENERAL RADIOLOGY | Facility: CLINIC | Age: 56
End: 2021-10-23
Attending: EMERGENCY MEDICINE
Payer: COMMERCIAL

## 2021-10-23 ENCOUNTER — HOSPITAL ENCOUNTER (EMERGENCY)
Facility: CLINIC | Age: 56
Discharge: HOME | End: 2021-10-23
Attending: EMERGENCY MEDICINE | Admitting: EMERGENCY MEDICINE
Payer: COMMERCIAL

## 2021-10-23 ENCOUNTER — APPOINTMENT (OUTPATIENT)
Dept: CT IMAGING | Facility: CLINIC | Age: 56
End: 2021-10-23
Attending: EMERGENCY MEDICINE
Payer: COMMERCIAL

## 2021-10-23 VITALS
WEIGHT: 258 LBS | OXYGEN SATURATION: 98 % | DIASTOLIC BLOOD PRESSURE: 83 MMHG | BODY MASS INDEX: 44.05 KG/M2 | TEMPERATURE: 99.2 F | HEIGHT: 64 IN | SYSTOLIC BLOOD PRESSURE: 151 MMHG | HEART RATE: 70 BPM | RESPIRATION RATE: 18 BRPM

## 2021-10-23 DIAGNOSIS — S50.02XA CONTUSION OF LEFT ELBOW, INITIAL ENCOUNTER: ICD-10-CM

## 2021-10-23 DIAGNOSIS — S39.012A LOW BACK STRAIN, INITIAL ENCOUNTER: ICD-10-CM

## 2021-10-23 DIAGNOSIS — S86.912A STRAIN OF BOTH KNEES, INITIAL ENCOUNTER: ICD-10-CM

## 2021-10-23 DIAGNOSIS — S60.222A CONTUSION OF LEFT HAND, INITIAL ENCOUNTER: ICD-10-CM

## 2021-10-23 DIAGNOSIS — S16.1XXA NECK STRAIN, INITIAL ENCOUNTER: ICD-10-CM

## 2021-10-23 DIAGNOSIS — S86.911A STRAIN OF BOTH KNEES, INITIAL ENCOUNTER: ICD-10-CM

## 2021-10-23 DIAGNOSIS — S09.90XA CLOSED HEAD INJURY, INITIAL ENCOUNTER: ICD-10-CM
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
## 2021-10-23 PROCEDURE — 99285 EMERGENCY DEPT VISIT HI MDM: CPT | Mod: 25

## 2021-10-23 PROCEDURE — 73130 X-RAY EXAM OF HAND: CPT | Mod: LT

## 2021-10-23 PROCEDURE — 72100 X-RAY EXAM L-S SPINE 2/3 VWS: CPT

## 2021-10-23 PROCEDURE — 250N000013 HC RX MED GY IP 250 OP 250 PS 637: Performed by: EMERGENCY MEDICINE

## 2021-10-23 PROCEDURE — 73560 X-RAY EXAM OF KNEE 1 OR 2: CPT | Mod: 50

## 2021-10-23 PROCEDURE — 72040 X-RAY EXAM NECK SPINE 2-3 VW: CPT

## 2021-10-23 PROCEDURE — 73080 X-RAY EXAM OF ELBOW: CPT | Mod: LT

## 2021-10-23 PROCEDURE — 70450 CT HEAD/BRAIN W/O DYE: CPT

## 2021-10-23 RX ORDER — HYDROCODONE BITARTRATE AND ACETAMINOPHEN 5; 325 MG/1; MG/1
2 TABLET ORAL ONCE
Status: COMPLETED | OUTPATIENT
Start: 2021-10-23 | End: 2021-10-23

## 2021-10-23 RX ORDER — ASPIRIN 81 MG/1
81 TABLET, CHEWABLE ORAL DAILY
COMMUNITY

## 2021-10-23 RX ORDER — LOSARTAN POTASSIUM 50 MG/1
TABLET ORAL DAILY
COMMUNITY

## 2021-10-23 RX ORDER — HYDROCODONE BITARTRATE AND ACETAMINOPHEN 5; 325 MG/1; MG/1
2 TABLET ORAL ONCE
Status: DISCONTINUED | OUTPATIENT
Start: 2021-10-23 | End: 2021-10-23

## 2021-10-23 RX ADMIN — HYDROCODONE BITARTRATE AND ACETAMINOPHEN 2 TABLET: 5; 325 TABLET ORAL at 21:55

## 2021-10-23 ASSESSMENT — ENCOUNTER SYMPTOMS
NUMBNESS: 1
ABDOMINAL PAIN: 0
HEADACHES: 1
BACK PAIN: 1
ARTHRALGIAS: 1

## 2021-10-23 ASSESSMENT — MIFFLIN-ST. JEOR: SCORE: 1735.28

## 2021-10-24 NOTE — ED TRIAGE NOTES
Fall at the [employer], slipped in some water and fell onto the left side. Struck head, now complaining of headache and nausea. Left arm, elbow, left hand pain, bilateral knee pain, lower back pain. She is unsure if she lost consciousness. Diabetic and hx Htn. Baby aspirin daily.

## 2021-10-24 NOTE — ED PROVIDER NOTES
History   Chief Complaint:  Fall ([place] the mall. Hip, left elbow, back pain. . No LOC.)       History is provided by the patient.    HPI   Kathy Iniguez is a 56 year old female with history of hypertension, morbid obesity, type II diabetes, anemia, and knee arthroscopy who presents with a fall. She says she was at [employer] when she slipped on a puddle on the ground and fall on her left side. On evaluation, she is complaining of right knee pain, left knee pain, left hip pain, left elbow pain, and a sharp low back pain that stems from her left hip. She did hit her head during the fall and she is unsure if she lost consciousness. She affirms having a headache on evaluation. She additionally complains of numbness on the left side of her body. She denies any right arm pain, abdominal pain, chest pain, or dental pain. She denies any alcohol use tonight.    Review of Systems   HENT: Negative for dental problem.    Cardiovascular: Negative for chest pain.   Gastrointestinal: Negative for abdominal pain.   Musculoskeletal: Positive for arthralgias (see HPI) and back pain (lower).        (-) for right arm pain.   Neurological: Positive for numbness (left side of body) and headaches.   All other systems reviewed and are negative.    Allergies:  Penicillins  Latex    Medications:  Norvasc  Hygroten  Topamax  Elavil  Metformin  Diovan  Oretic    Past Medical History:     Depression  Hypertension  Knee pain  Lower extremity edema  Morbid obesity  Intertrigo  Migraine  Stress incontinence    Type II diabetes  Anemia  BPPV    Past Surgical History:    Abdomen surgery   section  Cholecystectomy   Knee arthroscopy  Ovarian cyst removal    Family History:    Diabetes  Hypertension  CAD  Glaucoma  Blindness  Cataract    Social History:  Patient came [facility].  Patient is unaccompanied in the ED.  Patient denies alcohol use.    Physical Exam     Patient Vitals for the past 24 hrs:   BP Temp Temp src Pulse Resp  "SpO2 Height Weight   10/23/21 2200 (!) 151/83 -- -- -- -- -- -- --   10/23/21 2036 -- 99.2  F (37.3  C) Temporal 70 18 98 % 1.626 m (5' 4\") 117 kg (258 lb)   10/23/21 2034 -- 99.2  F (37.3  C) Temporal -- -- -- -- --     Physical Exam  SKIN:  Warm, dry. Atraumatic.  HEMATOLOGIC/IMMUNOLOGIC/LYMPHATIC:  No pallor.  HENT: No facial or scalp trauma. No oral mucosal lingual or dental trauma. Cervical spine equivocally tender to firm palpation. Range of motion of head/neck exacerbates posterior neck pain.  EYES:  Conjunctivae normal. Normal extraocular motion. Pupils equal round and reactive to light. Visual fields intact.  CARDIOVASCULAR:  Regular rate and rhythm.  No murmur.  RESPIRATORY:  No respiratory distress, breath sounds equal and normal.  GASTROINTESTINAL:  Soft, nontender abdomen.  MUSCULOSKELETAL: Overweight body habitus. No thoracic pain with anterior posterior thoracic compression. Left hand and elbow tender to palpation and passive range of motion. Painless passive range of motion of right upper extremity. Range of motion of the bilateral lower extremities exacerbates bilateral knee pain. No right or left hip pain with range of motion of the lower limbs. Nontender bilateral feet and ankles. Range of motion of torso exacerbates low back pain.  NEUROLOGIC:  Alert, conversant. GCS 15. Oriented to self place and time. No aphasia or dysarthria. No gross motor deficit. No gross tactile sensory deficit.  PSYCHIATRIC:  Normal mood.    Emergency Department Course     Imaging:    Head CT w/o contrast   Final Result   IMPRESSION:   1.  No acute intracranial process.      Lumbar spine XR, 2-3 views   Final Result   IMPRESSION: Nomenclature is based on 5 lumbar vertebral bodies. No gross vertebral body height loss identified. Alignment appears within normal limits. Minimal multilevel degenerative disc space narrowing with marginal endplate osteophytes. There may be    mild lower lumbar degenerative facet arthropathy.    "   XR Knee Bilateral 1/2 Views   Final Result   IMPRESSION:    RIGHT KNEE: No fracture or joint effusion. Moderate degenerative changes in the medial compartment and mild degenerative changes in the lateral compartment.      LEFT KNEE: No fracture or joint effusion. Mild degenerative changes in the medial and lateral compartments.      XR Hand Left G/E 3 Views   Final Result   IMPRESSION: No fracture or dislocation. No significant degenerative changes.      Elbow  XR, G/E 3 views, left   Final Result   IMPRESSION: Normal joint spaces and alignment. No fracture or joint effusion.      Cervical spine XR, 2-3 views   Final Result   IMPRESSION: The examination is somewhat limited, as the lower aspect of the cervical spine (C6, C7 and the cervicothoracic junction) are obscured on the lateral and swimmer's views by significant radiographic overlap with the patient's shoulders. Within    that limitation, no gross vertebral body height loss is identified. There is straightening of the normal cervical lordosis, which may be positional. Alignment otherwise appears normal. The visualized intervertebral disc spaces appear grossly maintained.    The prevertebral soft tissues are not well assessed due to significant soft tissue radiographic overlap on the lateral view. If there is ongoing clinical concern for radiographically occult fracture, recommend CT, as radiographs are insensitive for the    detection of acute traumatic injury of the spine.          Report per radiology     Emergency Department Course:    Reviewed:  I reviewed nursing notes, vitals, past medical history and Care Everywhere    Assessments:  2032 I obtained history and examined the patient as noted above.    I rechecked the patient and explained findings.    Interventions:  Medications   HYDROcodone-acetaminophen (NORCO) 5-325 MG per tablet 2 tablet (2 tablets Oral Given 10/23/21 2155)     Disposition:  The patient was discharged to home.     Impression & Plan      Medical Decision Making:  This patient presents after a fall from a standing height. Slipped on wet floor. Suffered a number of injuries hence a number of imaging studies were performed to screen for fracture or intracranial pathology. Gladly everything was negative. Afterwards she ambulated. Norco was somewhat helpful for pain control. She is able to be discharged home. I advised over-the-counter medication for pain with ice and recheck as needed.    Diagnosis:    ICD-10-CM    1. Fall, initial encounter  W19.XXXA    2. Closed head injury, initial encounter  S09.90XA    3. Neck strain, initial encounter  S16.1XXA    4. Low back strain, initial encounter  S39.012A    5. Strain of both knees, initial encounter  S86.911A     S86.912A    6. Contusion of left elbow, initial encounter  S50.02XA    7. Contusion of left hand, initial encounter  S60.222A        Discharge Medications:  Discharge Medication List as of 10/23/2021 10:56 PM          Scribe Disclosure:  I, Elvin Thomas, am serving as a scribe at 8:31 PM on 10/23/2021 to document services personally performed by Asa Bruce MD based on my observations and the provider's statements to me.                Asa Bruce MD  10/23/21 8018

## 2022-03-19 ENCOUNTER — HEALTH MAINTENANCE LETTER (OUTPATIENT)
Age: 57
End: 2022-03-19

## 2022-05-14 ENCOUNTER — HEALTH MAINTENANCE LETTER (OUTPATIENT)
Age: 57
End: 2022-05-14

## 2022-09-03 ENCOUNTER — HEALTH MAINTENANCE LETTER (OUTPATIENT)
Age: 57
End: 2022-09-03

## 2023-06-03 ENCOUNTER — HEALTH MAINTENANCE LETTER (OUTPATIENT)
Age: 58
End: 2023-06-03

## 2024-07-06 ENCOUNTER — HEALTH MAINTENANCE LETTER (OUTPATIENT)
Age: 59
End: 2024-07-06